# Patient Record
Sex: FEMALE | Race: WHITE
[De-identification: names, ages, dates, MRNs, and addresses within clinical notes are randomized per-mention and may not be internally consistent; named-entity substitution may affect disease eponyms.]

---

## 2018-04-12 ENCOUNTER — HOSPITAL ENCOUNTER (OUTPATIENT)
Dept: HOSPITAL 17 - HROP | Age: 83
Discharge: HOME | End: 2018-04-12
Attending: FAMILY MEDICINE
Payer: MEDICARE

## 2018-04-12 VITALS
OXYGEN SATURATION: 97 % | RESPIRATION RATE: 20 BRPM | SYSTOLIC BLOOD PRESSURE: 123 MMHG | DIASTOLIC BLOOD PRESSURE: 47 MMHG | HEART RATE: 48 BPM

## 2018-04-12 VITALS
RESPIRATION RATE: 20 BRPM | HEART RATE: 52 BPM | OXYGEN SATURATION: 97 % | DIASTOLIC BLOOD PRESSURE: 67 MMHG | SYSTOLIC BLOOD PRESSURE: 139 MMHG

## 2018-04-12 VITALS
HEART RATE: 52 BPM | OXYGEN SATURATION: 97 % | SYSTOLIC BLOOD PRESSURE: 135 MMHG | RESPIRATION RATE: 20 BRPM | DIASTOLIC BLOOD PRESSURE: 52 MMHG

## 2018-04-12 VITALS
HEART RATE: 56 BPM | DIASTOLIC BLOOD PRESSURE: 64 MMHG | RESPIRATION RATE: 20 BRPM | OXYGEN SATURATION: 94 % | TEMPERATURE: 97.5 F | SYSTOLIC BLOOD PRESSURE: 162 MMHG

## 2018-04-12 VITALS
HEART RATE: 45 BPM | OXYGEN SATURATION: 97 % | DIASTOLIC BLOOD PRESSURE: 83 MMHG | SYSTOLIC BLOOD PRESSURE: 114 MMHG | RESPIRATION RATE: 20 BRPM

## 2018-04-12 VITALS
SYSTOLIC BLOOD PRESSURE: 169 MMHG | HEART RATE: 52 BPM | RESPIRATION RATE: 20 BRPM | OXYGEN SATURATION: 97 % | DIASTOLIC BLOOD PRESSURE: 65 MMHG

## 2018-04-12 VITALS
SYSTOLIC BLOOD PRESSURE: 126 MMHG | HEART RATE: 42 BPM | RESPIRATION RATE: 20 BRPM | DIASTOLIC BLOOD PRESSURE: 54 MMHG | OXYGEN SATURATION: 97 %

## 2018-04-12 VITALS
DIASTOLIC BLOOD PRESSURE: 47 MMHG | RESPIRATION RATE: 20 BRPM | SYSTOLIC BLOOD PRESSURE: 109 MMHG | HEART RATE: 43 BPM | OXYGEN SATURATION: 97 %

## 2018-04-12 VITALS — BODY MASS INDEX: 25.44 KG/M2 | HEIGHT: 62 IN | WEIGHT: 138.23 LBS

## 2018-04-12 DIAGNOSIS — I73.9: ICD-10-CM

## 2018-04-12 DIAGNOSIS — K55.9: ICD-10-CM

## 2018-04-12 DIAGNOSIS — I48.91: ICD-10-CM

## 2018-04-12 DIAGNOSIS — I70.8: ICD-10-CM

## 2018-04-12 DIAGNOSIS — I11.0: ICD-10-CM

## 2018-04-12 DIAGNOSIS — J44.9: ICD-10-CM

## 2018-04-12 DIAGNOSIS — I77.4: Primary | ICD-10-CM

## 2018-04-12 DIAGNOSIS — I50.9: ICD-10-CM

## 2018-04-12 LAB
BUN SERPL-MCNC: 33 MG/DL (ref 7–18)
CALCIUM SERPL-MCNC: 9.2 MG/DL (ref 8.5–10.1)
CHLORIDE SERPL-SCNC: 107 MEQ/L (ref 98–107)
CREAT SERPL-MCNC: 1.12 MG/DL (ref 0.5–1)
GFR SERPLBLD BASED ON 1.73 SQ M-ARVRAT: 47 ML/MIN (ref 89–?)
GLUCOSE SERPL-MCNC: 83 MG/DL (ref 74–106)
HCO3 BLD-SCNC: 28.4 MEQ/L (ref 21–32)
INR PPP: 1 RATIO
PROTHROMBIN TIME: 10.3 SEC (ref 9.8–11.6)
SODIUM SERPL-SCNC: 142 MEQ/L (ref 136–145)

## 2018-04-12 PROCEDURE — C1769 GUIDE WIRE: HCPCS

## 2018-04-12 PROCEDURE — 75726 ARTERY X-RAYS ABDOMEN: CPT

## 2018-04-12 PROCEDURE — C1876 STENT, NON-COA/NON-COV W/DEL: HCPCS

## 2018-04-12 PROCEDURE — 36245 INS CATH ABD/L-EXT ART 1ST: CPT

## 2018-04-12 PROCEDURE — 85610 PROTHROMBIN TIME: CPT

## 2018-04-12 PROCEDURE — 85730 THROMBOPLASTIN TIME PARTIAL: CPT

## 2018-04-12 PROCEDURE — 37236 OPEN/PERQ PLACE STENT 1ST: CPT

## 2018-04-12 PROCEDURE — 76937 US GUIDE VASCULAR ACCESS: CPT

## 2018-04-12 PROCEDURE — 99152 MOD SED SAME PHYS/QHP 5/>YRS: CPT

## 2018-04-12 PROCEDURE — C1894 INTRO/SHEATH, NON-LASER: HCPCS

## 2018-04-12 PROCEDURE — 80048 BASIC METABOLIC PNL TOTAL CA: CPT

## 2018-04-12 PROCEDURE — C1887 CATHETER, GUIDING: HCPCS

## 2018-04-12 PROCEDURE — 99153 MOD SED SAME PHYS/QHP EA: CPT

## 2018-04-12 NOTE — RADRPT
EXAM DATE/TIME:  04/12/2018 14:17 

 

[facility] COMPARISON:  

No previous studies available for comparison.

 

 

INDICATIONS :      

82 year-old female with history of peripheral arterial disease and severe celiac artery and moderate 
stenosis of the SMA and TERRENCE who presents with symptoms of mesenteric ischemia. Plan is for celiac art
eli stent placement.

                        

 

MEDICAL HISTORY :     

PAD

Intermittent claudication

HTN

COPD

CHF

AFIB

 

SURGICAL HISTORY :     

Mitral valve

 

ENCOUNTER:     

Initial

 

ACUITY:     

2 weeks

 

PAIN SCORE:     

0/10

                       

                        

 

FLUORO TIME:     

15.7 minutes

 

IMAGE SERIES:     

8

 

ACCESS SITE:     

Left Radial artery 

 

SEDATION TIME:     

60 minutes

 

CONTRAST:       

1.)  70 cc Visipaque (iodixanol)

 

MEDICATION(S):       

1.)  4.5 mg midazolam (Versed)  IV     

2.)  250 mcg fentanyl (Sublimaze)  IV     

3.)  5,000 units Heparin  IV     

      

      

 

DEVICE(S):      

1.)   celiac artery [id number] Express stent (balloon expanding)      

 

 

PROCEDURE :     

1.  Ultrasound-guided puncture of the access site.

2.  Conscious sedation with continuous EKG and Oximetry monitoring.

3.  Selective catheter placement in the celiac artery with selective angiography

4.  Lateral abdominal aortogram 

5.  Celiac artery stent placement

 

The risks, benefits and alternatives to the procedure were explained and verbal and written consent w
as obtained.  The site was prepped in sterile fashion.  Full sterile technique was used, including ca
p, mask, sterile gloves and gown and a large sterile sheet.  Hand hygiene and 2% chlorhexidine and/or
 betadine/alcohol prep was utilized per protocol for cutaneous antisepsis.  Sterile gel and sterile p
robe cover were utilized for ultrasound guidance.  The skin and subcutaneous tissues were infiltrated
 with local anesthetic solution.  

 

With ultrasound and fluoroscopic guidance the selected artery was punctured and a vascular sheath was
 placed. A 4 [place] D[place] catheter was then used to select celiac artery and angiography was performe
d. Abdominal aortogram was also performed confirming severe focal, approximately 70-80% stenosis, of 
the celiac origin. Therefore, a 6 mm balloon expandable stent was deployed in the celiac origin. Subs
equent angiography demonstrated good angiographic result with widely patent stent. Wires and catheter
s were then removed. The radial artery sheath was removed and hemostasis obtained with a TR band nida
ce.

 

 

Conscious sedation was performed with the prescribed dosages and duration as above in the presence of
 an independent trained radiology nurse to assist in the monitoring of the patient.  EKG and oximetry
 remained stable throughout the procedure.

 

CONCLUSION:     

1. Severe focal, approximately 70-80% stenosis, of the celiac origin.

2. Uncomplicated 6 mm celiac artery stent placement, as above. 

 

 

 Pascual Ramirez MD on April 12, 2018 at 16:17           

Board Certified Radiologist.

 This report was verified electronically.

## 2018-04-20 ENCOUNTER — HOSPITAL ENCOUNTER (OUTPATIENT)
Dept: HOSPITAL 17 - HROP | Age: 83
Discharge: HOME | End: 2018-04-20
Attending: RADIOLOGY
Payer: MEDICARE

## 2018-04-20 DIAGNOSIS — Z09: Primary | ICD-10-CM

## 2018-04-20 NOTE — RADRPT
EXAM DATE/TIME:  04/20/2018 13:56 

 

[facility] COMPARISON :  

No previous studies available for comparison.

 

INDICATIONS :      

F/U Celiac angio

                           

 

OBJECTIVE:      

 

Temperature:     

98.0

 

Heart Rate:     

47

 

Blood Pressure:     

132/59

 

Respiratory:     

20

 

Oximetry:     

94

                           

 

PNEUMONIA VACCINE:       

YES     

                           

 

HISTORY OF PRESENT ILLNESS: 

82-year-old female postop day #8 status post celiac stent placement. She has no complaints and report
s resolution of her postprandial pain and diarrhea. Her radial artery access site is unremarkable. Betsy paul is currently on Plavix.

 

PAST MEDICAL HISTORY :  

1.     Stroke.

2.     Cardiovascular disease.

3.     Hypertension.

4.     Rneal failure, chronic.

       Afib

 

PAST SURGICAL HISTORY :

1.     MVR

2.     bladder stimulator

       

 

ALLERGIES:

1.         doxycyline

2.         nitrofuritoin

 

MEDICATIONS:  

1.     Rutienc76 mg 

2.     Plavix (Glopidogrel Bisulfate) 75 mg 

3.     Atorvastatin 80 mg 

4.     Estradiol Vag cream  

5.     Famotidine 20 mg 

6.     Hydralozine 50  mg 

7.     Fexofenadine 180 mg 

8.     Lasix (Furosemide)40 mg 

9.     Hydroxychloroquine 200 mg 

10.    Prinivil (Lisinopril)20 mg 

 

 

 

ASSESSMENT:

Uncomplicated postoperative course following celiac artery stent placement with resolution of suspect
ed mesenteric ischemia symptoms.

 

PLAN: 

Patient is to continue Plavix for at least 6 months the. Ideally, would recommend baseline imaging (M
esenteric Doppler US is sufficient) in approximately 2 months and 1 year with subsequent yearly imagi
ng followup.

 

TIME SPENT:

20 minutes.

 

 

 

 Pascual Ramirez MD on April 20, 2018 at 16:14           

Board Certified Radiologist.

 This report was verified electronically.